# Patient Record
Sex: MALE | Race: WHITE | NOT HISPANIC OR LATINO | Employment: FULL TIME | URBAN - METROPOLITAN AREA
[De-identification: names, ages, dates, MRNs, and addresses within clinical notes are randomized per-mention and may not be internally consistent; named-entity substitution may affect disease eponyms.]

---

## 2023-10-13 ENCOUNTER — OFFICE VISIT (OUTPATIENT)
Dept: FAMILY MEDICINE | Facility: CLINIC | Age: 28
End: 2023-10-13
Payer: MEDICAID

## 2023-10-13 VITALS
OXYGEN SATURATION: 98 % | HEART RATE: 85 BPM | WEIGHT: 203.13 LBS | SYSTOLIC BLOOD PRESSURE: 160 MMHG | DIASTOLIC BLOOD PRESSURE: 103 MMHG | TEMPERATURE: 98 F

## 2023-10-13 DIAGNOSIS — Z76.89 ENCOUNTER TO ESTABLISH CARE: Primary | ICD-10-CM

## 2023-10-13 DIAGNOSIS — F32.A DEPRESSION, UNSPECIFIED DEPRESSION TYPE: ICD-10-CM

## 2023-10-13 DIAGNOSIS — R03.0 ELEVATED BLOOD PRESSURE READING: ICD-10-CM

## 2023-10-13 PROCEDURE — 3080F PR MOST RECENT DIASTOLIC BLOOD PRESSURE >= 90 MM HG: ICD-10-PCS | Mod: CPTII,,, | Performed by: NURSE PRACTITIONER

## 2023-10-13 PROCEDURE — 3080F DIAST BP >= 90 MM HG: CPT | Mod: CPTII,,, | Performed by: NURSE PRACTITIONER

## 2023-10-13 PROCEDURE — 3077F PR MOST RECENT SYSTOLIC BLOOD PRESSURE >= 140 MM HG: ICD-10-PCS | Mod: CPTII,,, | Performed by: NURSE PRACTITIONER

## 2023-10-13 PROCEDURE — 3077F SYST BP >= 140 MM HG: CPT | Mod: CPTII,,, | Performed by: NURSE PRACTITIONER

## 2023-10-13 PROCEDURE — 99203 PR OFFICE/OUTPT VISIT, NEW, LEVL III, 30-44 MIN: ICD-10-PCS | Mod: ,,, | Performed by: NURSE PRACTITIONER

## 2023-10-13 PROCEDURE — 99203 OFFICE O/P NEW LOW 30 MIN: CPT | Mod: ,,, | Performed by: NURSE PRACTITIONER

## 2023-10-13 RX ORDER — IBUPROFEN 800 MG/1
800 TABLET ORAL EVERY 6 HOURS PRN
COMMUNITY
Start: 2023-08-11

## 2023-10-13 RX ORDER — LISINOPRIL 10 MG/1
10 TABLET ORAL DAILY
Qty: 30 TABLET | Refills: 0 | Status: SHIPPED | OUTPATIENT
Start: 2023-10-13 | End: 2023-11-09 | Stop reason: SDUPTHER

## 2023-10-13 RX ORDER — ACETAMINOPHEN AND CODEINE PHOSPHATE 300; 30 MG/1; MG/1
1 TABLET ORAL
COMMUNITY
Start: 2023-08-11 | End: 2024-02-06 | Stop reason: ALTCHOICE

## 2023-10-13 NOTE — PROGRESS NOTES
SUBJECTIVE:     History of Present Illness      Chief Complaint: Establish Care (No pcp in 10 yrs)    HPI:  Patient is a 28 y.o. year old male who presents to clinic to establish care as a new pt. No previous PCP .   Patient presents to the clinic today with his father ARACELI .  Reports he went to the dental office for dental work done,  subsequently noted to have elevated blood pressure readings  for multiple dental appointments.    Patient describes some minor headaches, which may  be associated with elevated blood pressure readings..  Patient presents to the clinic with a blood pressure log with elevated readings.    Denies any dizziness, chest pain palpitations or shortness a breath.  Patient took a dose of 2 of his father's lisinopril to  see if that made any difference in blood pressure.  Family history paternal hypertension /cardiac stents    Patient denies SI, HI.  States sometimes he just feels like he has little interest in some activities.    Review of Systems:    Review of Systems    12 point review of systems conducted, negative except as stated in the history of present illness. See HPI for details.     Previous History      Review of patient's allergies indicates:  No Known Allergies    Past Medical History:   Diagnosis Date    Anxiety     Depression     Hypertension      Current Outpatient Medications   Medication Instructions    acetaminophen-codeine 300-30mg (TYLENOL #3) 300-30 mg Tab 1 tablet, Oral, Every 4-6 hours PRN    ibuprofen (ADVIL,MOTRIN) 800 mg, Oral, Every 6 hours PRN    lisinopriL 10 mg, Oral, Daily     History reviewed. No pertinent surgical history.  Family History   Problem Relation Age of Onset    Cancer Mother     Hypertension Father     Diabetes Father     Heart disease Father        Social History     Tobacco Use    Smoking status: Never    Smokeless tobacco: Never   Substance Use Topics    Alcohol use: Never    Drug use: Never        Health Maintenance      Health Maintenance    Topic Date Due    Hepatitis C Screening  Never done    Lipid Panel  Never done    TETANUS VACCINE  05/13/2015       OBJECTIVE:     Physical Exam      Vital Signs Reviewed   Visit Vitals  BP (!) 160/103   Pulse 85   Temp 97.9 °F (36.6 °C)   Wt 92.1 kg (203 lb 1.6 oz)   SpO2 98%       Physical Exam    Physical Exam:  General: Alert, well nourished, no acute distress, non-toxic appearing.   Eyes: Anicteric sclera, without conjunctival injection, normal lids, no purulent drainage, EOMs grossly intact.   Ears: No tragal tenderness. Tympanic membranes intact, pearly grey, without effusion or erythema and with a positive light reflex.   Mouth: Posterior pharynx without erythema. No exudate, ulcerations, or lesion. No tonsillar swelling.   Neck: Supple, full ROM, no rigidity, no cervical adenopathy.   Cardio: Normal rate and rhythm    Resp: Respirations even and unlabored, clear to auscultation bilaterally.   Abd: No ecchymosis or distension. Normal bowel sounds in all 4 quadrants. No tenderness to palpation. No rebound tenderness or guarding. No CVA tenderness.   Skin: No rashes or open lesions noted.   MSK: No swelling. No abrasions or signs of trauma. Ambulating without assistance.   Neuro: Alert,oriented No focal deficits noted. Facial expressions even.   Psych: Cooperative, Normal affect          Assessment            ICD-10-CM ICD-9-CM   1. Encounter to establish care  Z76.89 V65.8   2. Elevated blood pressure reading  R03.0 796.2   3. Depression, unspecified depression type  F32.A 311       Plan       1. Encounter to establish care  Lab work ordered patient will complete prior to next office visit  2. Elevated blood pressure reading  - lisinopriL 10 MG tablet; Take 1 tablet (10 mg total) by mouth once daily.  Dispense: 30 tablet; Refill: 0.    Start new /current medication as prescribed   Low salt/ DASH diet   Discussed lifestyle modifications.   encourage aerobic excise at least 30 mins a day   Monitor BP daily  goal less than 140/90.    Log given to patient yo monitor BP check in 2 weeks   Denies headaches, blurred vision, or dizziness    3. Depression  Denies SI, HI or hallucinations.  Discussed with patient cognitive behavior therapy.  ED precautions discussed.  We will re-evaluate next few weeks if needed we can initiate medication    Orders Placed This Encounter    CBC Auto Differential    Comprehensive Metabolic Panel    Lipid Panel    TSH    Hemoglobin A1C    Urinalysis    Vitamin D    T4, Free    lisinopriL 10 MG tablet      Medication List with Changes/Refills   New Medications    LISINOPRIL 10 MG TABLET    Take 1 tablet (10 mg total) by mouth once daily.   Current Medications    ACETAMINOPHEN-CODEINE 300-30MG (TYLENOL #3) 300-30 MG TAB    Take 1 tablet by mouth every 4 to 6 hours as needed.    IBUPROFEN (ADVIL,MOTRIN) 800 MG TABLET    Take 800 mg by mouth every 6 (six) hours as needed.       Follow up in about 2 weeks (around 10/27/2023) for Blood pressure check, Nurse Visit and wellness in 3 weeks labs prior.   Follow up in about 2 weeks (around 10/27/2023) for Blood pressure check, Nurse Visit and wellness in 3 weeks labs prior. In addition to their scheduled follow up, the patient has also been instructed to follow up on as needed basis.   Future Appointments   Date Time Provider Department Center   10/27/2023  9:45 AM NURSE, [facility]   11/9/2023  9:00 AM Sergio Brice, NILESH [facility]

## 2023-10-27 ENCOUNTER — CLINICAL SUPPORT (OUTPATIENT)
Dept: FAMILY MEDICINE | Facility: CLINIC | Age: 28
End: 2023-10-27
Payer: MEDICAID

## 2023-10-27 VITALS — SYSTOLIC BLOOD PRESSURE: 122 MMHG | DIASTOLIC BLOOD PRESSURE: 72 MMHG

## 2023-10-27 DIAGNOSIS — R03.0 ELEVATED BLOOD PRESSURE READING: Primary | ICD-10-CM

## 2023-10-27 NOTE — PROGRESS NOTES
Patient returns today for 2 week nurse visit for B/P check.  No complaints noted.  B/P 122/72.  Instructed to keep appointment 11/09/2023 for wellness with labs prior.  Voices understanding and agrees.

## 2023-11-07 ENCOUNTER — LAB VISIT (OUTPATIENT)
Dept: LAB | Facility: HOSPITAL | Age: 28
End: 2023-11-07
Attending: NURSE PRACTITIONER
Payer: MEDICAID

## 2023-11-07 DIAGNOSIS — Z76.89 ENCOUNTER TO ESTABLISH CARE: ICD-10-CM

## 2023-11-07 LAB
ALBUMIN SERPL-MCNC: 4.3 G/DL (ref 3.5–5)
ALBUMIN/GLOB SERPL: 1.4 RATIO (ref 1.1–2)
ALP SERPL-CCNC: 83 UNIT/L (ref 40–150)
ALT SERPL-CCNC: 21 UNIT/L (ref 0–55)
APPEARANCE UR: CLEAR
AST SERPL-CCNC: 20 UNIT/L (ref 5–34)
BACTERIA #/AREA URNS AUTO: ABNORMAL /HPF
BASOPHILS # BLD AUTO: 0.03 X10(3)/MCL
BASOPHILS NFR BLD AUTO: 0.4 %
BILIRUB SERPL-MCNC: 0.6 MG/DL
BILIRUB UR QL STRIP.AUTO: ABNORMAL
BUN SERPL-MCNC: 14.7 MG/DL (ref 8.9–20.6)
CALCIUM SERPL-MCNC: 9.3 MG/DL (ref 8.4–10.2)
CHLORIDE SERPL-SCNC: 107 MMOL/L (ref 98–107)
CHOLEST SERPL-MCNC: 218 MG/DL
CHOLEST/HDLC SERPL: 7 {RATIO} (ref 0–5)
CO2 SERPL-SCNC: 27 MMOL/L (ref 22–29)
COLOR UR AUTO: YELLOW
CREAT SERPL-MCNC: 0.88 MG/DL (ref 0.73–1.18)
DEPRECATED CALCIDIOL+CALCIFEROL SERPL-MC: 19.1 NG/ML (ref 30–80)
EOSINOPHIL # BLD AUTO: 0.12 X10(3)/MCL (ref 0–0.9)
EOSINOPHIL NFR BLD AUTO: 1.8 %
ERYTHROCYTE [DISTWIDTH] IN BLOOD BY AUTOMATED COUNT: 12.5 % (ref 11.5–17)
EST. AVERAGE GLUCOSE BLD GHB EST-MCNC: 102.5 MG/DL
GFR SERPLBLD CREATININE-BSD FMLA CKD-EPI: >60 MLS/MIN/1.73/M2
GLOBULIN SER-MCNC: 3 GM/DL (ref 2.4–3.5)
GLUCOSE SERPL-MCNC: 101 MG/DL (ref 74–100)
GLUCOSE UR QL STRIP.AUTO: NEGATIVE
HBA1C MFR BLD: 5.2 %
HCT VFR BLD AUTO: 44.7 % (ref 42–52)
HDLC SERPL-MCNC: 32 MG/DL (ref 35–60)
HGB BLD-MCNC: 14.9 G/DL (ref 14–18)
IMM GRANULOCYTES # BLD AUTO: 0.02 X10(3)/MCL (ref 0–0.04)
IMM GRANULOCYTES NFR BLD AUTO: 0.3 %
KETONES UR QL STRIP.AUTO: NEGATIVE
LDLC SERPL CALC-MCNC: 153 MG/DL (ref 50–140)
LEUKOCYTE ESTERASE UR QL STRIP.AUTO: NEGATIVE
LYMPHOCYTES # BLD AUTO: 3 X10(3)/MCL (ref 0.6–4.6)
LYMPHOCYTES NFR BLD AUTO: 44.6 %
MCH RBC QN AUTO: 28 PG (ref 27–31)
MCHC RBC AUTO-ENTMCNC: 33.3 G/DL (ref 33–36)
MCV RBC AUTO: 84 FL (ref 80–94)
MONOCYTES # BLD AUTO: 0.49 X10(3)/MCL (ref 0.1–1.3)
MONOCYTES NFR BLD AUTO: 7.3 %
MUCOUS THREADS URNS QL MICRO: ABNORMAL /LPF
NEUTROPHILS # BLD AUTO: 3.06 X10(3)/MCL (ref 2.1–9.2)
NEUTROPHILS NFR BLD AUTO: 45.6 %
NITRITE UR QL STRIP.AUTO: NEGATIVE
PH UR STRIP.AUTO: 5.5 [PH]
PLATELET # BLD AUTO: 278 X10(3)/MCL (ref 130–400)
PMV BLD AUTO: 10.6 FL (ref 7.4–10.4)
POTASSIUM SERPL-SCNC: 4.2 MMOL/L (ref 3.5–5.1)
PROT SERPL-MCNC: 7.3 GM/DL (ref 6.4–8.3)
PROT UR QL STRIP.AUTO: ABNORMAL
RBC # BLD AUTO: 5.32 X10(6)/MCL (ref 4.7–6.1)
RBC #/AREA URNS AUTO: ABNORMAL /HPF
RBC UR QL AUTO: ABNORMAL
SODIUM SERPL-SCNC: 141 MMOL/L (ref 136–145)
SP GR UR STRIP.AUTO: >=1.03 (ref 1–1.03)
SQUAMOUS #/AREA URNS AUTO: ABNORMAL /HPF
T4 FREE SERPL-MCNC: 0.87 NG/DL (ref 0.7–1.48)
TRIGL SERPL-MCNC: 166 MG/DL (ref 34–140)
TSH SERPL-ACNC: 0.91 UIU/ML (ref 0.35–4.94)
UROBILINOGEN UR STRIP-ACNC: 0.2
VLDLC SERPL CALC-MCNC: 33 MG/DL
WBC # SPEC AUTO: 6.72 X10(3)/MCL (ref 4.5–11.5)
WBC #/AREA URNS AUTO: ABNORMAL /HPF

## 2023-11-07 PROCEDURE — 80053 COMPREHEN METABOLIC PANEL: CPT

## 2023-11-07 PROCEDURE — 85025 COMPLETE CBC W/AUTO DIFF WBC: CPT

## 2023-11-07 PROCEDURE — 83036 HEMOGLOBIN GLYCOSYLATED A1C: CPT

## 2023-11-07 PROCEDURE — 84439 ASSAY OF FREE THYROXINE: CPT

## 2023-11-07 PROCEDURE — 84443 ASSAY THYROID STIM HORMONE: CPT

## 2023-11-07 PROCEDURE — 80061 LIPID PANEL: CPT

## 2023-11-07 PROCEDURE — 82306 VITAMIN D 25 HYDROXY: CPT

## 2023-11-07 PROCEDURE — 36415 COLL VENOUS BLD VENIPUNCTURE: CPT

## 2023-11-09 ENCOUNTER — OFFICE VISIT (OUTPATIENT)
Dept: FAMILY MEDICINE | Facility: CLINIC | Age: 28
End: 2023-11-09
Payer: MEDICAID

## 2023-11-09 VITALS
WEIGHT: 203.13 LBS | BODY MASS INDEX: 30.79 KG/M2 | DIASTOLIC BLOOD PRESSURE: 84 MMHG | SYSTOLIC BLOOD PRESSURE: 126 MMHG | HEART RATE: 74 BPM | RESPIRATION RATE: 17 BRPM | TEMPERATURE: 98 F | OXYGEN SATURATION: 98 % | HEIGHT: 68 IN

## 2023-11-09 DIAGNOSIS — R03.0 ELEVATED BLOOD PRESSURE READING: ICD-10-CM

## 2023-11-09 DIAGNOSIS — Z97.3 WEARS GLASSES: ICD-10-CM

## 2023-11-09 DIAGNOSIS — I10 MILD HTN: ICD-10-CM

## 2023-11-09 DIAGNOSIS — E78.00 ELEVATED CHOLESTEROL: ICD-10-CM

## 2023-11-09 DIAGNOSIS — Z00.00 WELLNESS EXAMINATION: Primary | ICD-10-CM

## 2023-11-09 PROCEDURE — 3074F SYST BP LT 130 MM HG: CPT | Mod: CPTII,,, | Performed by: NURSE PRACTITIONER

## 2023-11-09 PROCEDURE — 3074F PR MOST RECENT SYSTOLIC BLOOD PRESSURE < 130 MM HG: ICD-10-PCS | Mod: CPTII,,, | Performed by: NURSE PRACTITIONER

## 2023-11-09 PROCEDURE — 4010F PR ACE/ARB THEARPY RXD/TAKEN: ICD-10-PCS | Mod: CPTII,,, | Performed by: NURSE PRACTITIONER

## 2023-11-09 PROCEDURE — 1159F MED LIST DOCD IN RCRD: CPT | Mod: CPTII,,, | Performed by: NURSE PRACTITIONER

## 2023-11-09 PROCEDURE — 3044F HG A1C LEVEL LT 7.0%: CPT | Mod: CPTII,,, | Performed by: NURSE PRACTITIONER

## 2023-11-09 PROCEDURE — 3044F PR MOST RECENT HEMOGLOBIN A1C LEVEL <7.0%: ICD-10-PCS | Mod: CPTII,,, | Performed by: NURSE PRACTITIONER

## 2023-11-09 PROCEDURE — 99395 PREV VISIT EST AGE 18-39: CPT | Mod: ,,, | Performed by: NURSE PRACTITIONER

## 2023-11-09 PROCEDURE — 3079F DIAST BP 80-89 MM HG: CPT | Mod: CPTII,,, | Performed by: NURSE PRACTITIONER

## 2023-11-09 PROCEDURE — 1159F PR MEDICATION LIST DOCUMENTED IN MEDICAL RECORD: ICD-10-PCS | Mod: CPTII,,, | Performed by: NURSE PRACTITIONER

## 2023-11-09 PROCEDURE — 4010F ACE/ARB THERAPY RXD/TAKEN: CPT | Mod: CPTII,,, | Performed by: NURSE PRACTITIONER

## 2023-11-09 PROCEDURE — 3008F BODY MASS INDEX DOCD: CPT | Mod: CPTII,,, | Performed by: NURSE PRACTITIONER

## 2023-11-09 PROCEDURE — 3008F PR BODY MASS INDEX (BMI) DOCUMENTED: ICD-10-PCS | Mod: CPTII,,, | Performed by: NURSE PRACTITIONER

## 2023-11-09 PROCEDURE — 99395 PR PREVENTIVE VISIT,EST,18-39: ICD-10-PCS | Mod: ,,, | Performed by: NURSE PRACTITIONER

## 2023-11-09 PROCEDURE — 3079F PR MOST RECENT DIASTOLIC BLOOD PRESSURE 80-89 MM HG: ICD-10-PCS | Mod: CPTII,,, | Performed by: NURSE PRACTITIONER

## 2023-11-09 RX ORDER — LISINOPRIL 10 MG/1
10 TABLET ORAL DAILY
Qty: 30 TABLET | Refills: 4 | Status: SHIPPED | OUTPATIENT
Start: 2023-11-09

## 2023-11-09 NOTE — PROGRESS NOTES
SUBJECTIVE:     History of Present Illness      Chief Complaint: Wellness exam (Discuss lab results.  No complaints at this time)    HPI:  Patient is a 28 y.o. year old male who presents to clinic for annual wellness and lab review.  The patient's general health status is described as good.  Since initiating lisinopril for hypertension control blood pressure has been stable.  Patient denies no unwanted side effects.  Denies chest pain, shortness and breath dizziness or blurred vision  Patient wears glasses last eye exam within 2 years ago.  Cholesterol elevated on labs. patient attributes elevated choleterol to diet .   most of his lifestyle including fast food, prepackaged  foods     Review of Systems:    Review of Systems    12 point review of systems conducted, negative except as stated in the history of present illness. See HPI for details.     Previous History      Review of patient's allergies indicates:  No Known Allergies    Past Medical History:   Diagnosis Date    Anxiety     Depression     Hypertension      Current Outpatient Medications   Medication Instructions    acetaminophen-codeine 300-30mg (TYLENOL #3) 300-30 mg Tab 1 tablet, Oral, Every 4-6 hours PRN    ibuprofen (ADVIL,MOTRIN) 800 mg, Oral, Every 6 hours PRN    lisinopriL 10 mg, Oral, Daily     History reviewed. No pertinent surgical history.  Family History   Problem Relation Age of Onset    Cancer Mother     Hypertension Father     Diabetes Father     Heart disease Father        Social History     Tobacco Use    Smoking status: Never    Smokeless tobacco: Never   Substance Use Topics    Alcohol use: Never    Drug use: Never        Health Maintenance      Health Maintenance   Topic Date Due    Hepatitis C Screening  Never done    TETANUS VACCINE  05/13/2015    Lipid Panel  Completed       OBJECTIVE:     Physical Exam      Vital Signs Reviewed   Visit Vitals  /84 (BP Location: Left arm, Patient Position: Sitting)   Pulse 74   Temp 97.5 °F  "(36.4 °C) (Oral)   Resp 17   Ht 5' 8" (1.727 m)   Wt 92.1 kg (203 lb 1.6 oz)   SpO2 98%   BMI 30.88 kg/m²       Physical Exam    Physical Exam:  General: Alert, well nourished, no acute distress, non-toxic appearing.   Eyes: Anicteric sclera, without conjunctival injection, normal lids, no purulent drainage, EOMs grossly intact.   Ears: No tragal tenderness. Tympanic membranes intact, pearly grey, without effusion or erythema and with a positive light reflex.   Mouth: Posterior pharynx without erythema. No exudate, ulcerations, or lesion. No tonsillar swelling.   Neck: Supple, full ROM, no rigidity, no cervical adenopathy.   Cardio: Normal rate and rhythm    Resp: Respirations even and unlabored, clear to auscultation bilaterally.   Abd: No ecchymosis or distension. Normal bowel sounds in all 4 quadrants. No tenderness to palpation. No rebound tenderness or guarding. No CVA tenderness.   Skin: No rashes or open lesions noted.   MSK: No swelling. No abrasions or signs of trauma. Ambulating without assistance.   Neuro: Alert,oriented No focal deficits noted. Facial expressions even.   Psych: Cooperative, Normal affect      Procedures    Procedures     Labs     Results for orders placed or performed in visit on 11/07/23   Comprehensive Metabolic Panel   Result Value Ref Range    Sodium Level 141 136 - 145 mmol/L    Potassium Level 4.2 3.5 - 5.1 mmol/L    Chloride 107 98 - 107 mmol/L    Carbon Dioxide 27 22 - 29 mmol/L    Glucose Level 101 (H) 74 - 100 mg/dL    Blood Urea Nitrogen 14.7 8.9 - 20.6 mg/dL    Creatinine 0.88 0.73 - 1.18 mg/dL    Calcium Level Total 9.3 8.4 - 10.2 mg/dL    Protein Total 7.3 6.4 - 8.3 gm/dL    Albumin Level 4.3 3.5 - 5.0 g/dL    Globulin 3.0 2.4 - 3.5 gm/dL    Albumin/Globulin Ratio 1.4 1.1 - 2.0 ratio    Bilirubin Total 0.6 <=1.5 mg/dL    Alkaline Phosphatase 83 40 - 150 unit/L    Alanine Aminotransferase 21 0 - 55 unit/L    Aspartate Aminotransferase 20 5 - 34 unit/L    eGFR >60 " mls/min/1.73/m2   Lipid Panel   Result Value Ref Range    Cholesterol Total 218 (H) <=200 mg/dL    HDL Cholesterol 32 (L) 35 - 60 mg/dL    Triglyceride 166 (H) 34 - 140 mg/dL    Cholesterol/HDL Ratio 7 (H) 0 - 5    Very Low Density Lipoprotein 33     LDL Cholesterol 153.00 (H) 50.00 - 140.00 mg/dL   TSH   Result Value Ref Range    TSH 0.907 0.350 - 4.940 uIU/mL   Hemoglobin A1C   Result Value Ref Range    Hemoglobin A1c 5.2 <=7.0 %    Estimated Average Glucose 102.5 mg/dL   Vitamin D   Result Value Ref Range    Vit D 25 OH 19.1 (L) 30.0 - 80.0 ng/mL   T4, Free   Result Value Ref Range    Thyroxine Free 0.87 0.70 - 1.48 ng/dL   CBC with Differential   Result Value Ref Range    WBC 6.72 4.50 - 11.50 x10(3)/mcL    RBC 5.32 4.70 - 6.10 x10(6)/mcL    Hgb 14.9 14.0 - 18.0 g/dL    Hct 44.7 42.0 - 52.0 %    MCV 84.0 80.0 - 94.0 fL    MCH 28.0 27.0 - 31.0 pg    MCHC 33.3 33.0 - 36.0 g/dL    RDW 12.5 11.5 - 17.0 %    Platelet 278 130 - 400 x10(3)/mcL    MPV 10.6 (H) 7.4 - 10.4 fL    Neut % 45.6 %    Lymph % 44.6 %    Mono % 7.3 %    Eos % 1.8 %    Basophil % 0.4 %    Lymph # 3.00 0.6 - 4.6 x10(3)/mcL    Neut # 3.06 2.1 - 9.2 x10(3)/mcL    Mono # 0.49 0.1 - 1.3 x10(3)/mcL    Eos # 0.12 0 - 0.9 x10(3)/mcL    Baso # 0.03 <=0.2 x10(3)/mcL    IG# 0.02 0 - 0.04 x10(3)/mcL    IG% 0.3 %       Chemistry:  Lab Results   Component Value Date     11/07/2023    K 4.2 11/07/2023    CHLORIDE 107 11/07/2023    BUN 14.7 11/07/2023    CREATININE 0.88 11/07/2023    EGFRNORACEVR >60 11/07/2023    GLUCOSE 101 (H) 11/07/2023    CALCIUM 9.3 11/07/2023    ALKPHOS 83 11/07/2023    LABPROT 7.3 11/07/2023    ALBUMIN 4.3 11/07/2023    AST 20 11/07/2023    ALT 21 11/07/2023    [id number] 19.1 (L) 11/07/2023    TSH 0.907 11/07/2023    VTAIAN0ONDD 0.87 11/07/2023        Lab Results   Component Value Date    HGBA1C 5.2 11/07/2023        Hematology:  Lab Results   Component Value Date    WBC 6.72 11/07/2023    HGB 14.9 11/07/2023    HCT 44.7  11/07/2023     11/07/2023       Lipid Panel:  Lab Results   Component Value Date    CHOL 218 (H) 11/07/2023    HDL 32 (L) 11/07/2023    .00 (H) 11/07/2023    TRIG 166 (H) 11/07/2023    TOTALCHOLEST 7 (H) 11/07/2023        Urine:  Lab Results   Component Value Date    COLORUA Yellow 11/07/2023    APPEARANCEUA Clear 11/07/2023    SGUA >=1.030 11/07/2023    PHUA 5.5 11/07/2023    PROTEINUA Trace (A) 11/07/2023    GLUCOSEUA Negative 11/07/2023    KETONESUA Negative 11/07/2023    BLOODUA Small (A) 11/07/2023    NITRITESUA Negative 11/07/2023    LEUKOCYTESUR Negative 11/07/2023    RBCUA 0-5 11/07/2023    WBCUA None Seen 11/07/2023    BACTERIA None Seen 11/07/2023         Assessment            ICD-10-CM ICD-9-CM   1. Wellness examination  Z00.00 V70.0   2. Mild HTN  I10 401.9   3. Elevated cholesterol  E78.00 272.0   4. Wears glasses  Z97.3 V49.89   5. Elevated blood pressure reading  R03.0 796.2       Plan       1. Wellness examination  Discussed labs and preventative screenings   Overall health status reviewed.    Significant chronic conditions addressed, including ongoing treatment plans.   Good health habits reinforced.    Cardiovascular disease risk factors discussed.   Appropriate recommendations and preventative care medical   information provided with annual wellness exams encouraged.  Vaccination status       2. Mild HTN    Improved  Controlled with medication.   Continue current medication as prescribed   Low salt/ DASH diet   Discussed lifestyle modifications.   encourage aerobic excise at least 30 mins a day   Monitor BP daily goal less than 140/90.   Denies headaches, blurred vision, or dizziness    3. Elevated cholesterol  - Lipid Panel; Future    4. Wears glasses    5. Elevated blood pressure reading  Improved   Controlled with medication.   Continue current medication as prescribed   Low salt/ DASH diet   Discussed lifestyle modifications.   encourage aerobic excise at least 30 mins a day    Monitor BP daily goal less than 140/90.   Denies headaches, blurred vision, or dizziness    Orders Placed This Encounter    Lipid Panel      Medication List with Changes/Refills   Current Medications    ACETAMINOPHEN-CODEINE 300-30MG (TYLENOL #3) 300-30 MG TAB    Take 1 tablet by mouth every 4 to 6 hours as needed.    IBUPROFEN (ADVIL,MOTRIN) 800 MG TABLET    Take 800 mg by mouth every 6 (six) hours as needed.    LISINOPRIL 10 MG TABLET    Take 1 tablet (10 mg total) by mouth once daily.       Follow up in about 12 weeks (around 2/1/2024) for Cholesterol recheck/ Labs prior    .   Follow up in about 12 weeks (around 2/1/2024) for Cholesterol recheck/ Labs prior    . In addition to their scheduled follow up, the patient has also been instructed to follow up on as needed basis.   Future Appointments   Date Time Provider Department Center   2/1/2024 10:30 AM Sergio Brice FNP [facility]   11/11/2024  8:15 AM Sergio Brice FNP [facility]

## 2024-01-31 ENCOUNTER — LAB VISIT (OUTPATIENT)
Dept: LAB | Facility: HOSPITAL | Age: 29
End: 2024-01-31
Attending: NURSE PRACTITIONER
Payer: MEDICAID

## 2024-01-31 DIAGNOSIS — E78.00 ELEVATED CHOLESTEROL: ICD-10-CM

## 2024-01-31 LAB
CHOLEST SERPL-MCNC: 202 MG/DL
CHOLEST/HDLC SERPL: 7 {RATIO} (ref 0–5)
HDLC SERPL-MCNC: 30 MG/DL (ref 35–60)
LDLC SERPL CALC-MCNC: 141 MG/DL (ref 50–140)
TRIGL SERPL-MCNC: 156 MG/DL (ref 34–140)
VLDLC SERPL CALC-MCNC: 31 MG/DL

## 2024-01-31 PROCEDURE — 80061 LIPID PANEL: CPT

## 2024-01-31 PROCEDURE — 36415 COLL VENOUS BLD VENIPUNCTURE: CPT

## 2024-02-01 ENCOUNTER — OFFICE VISIT (OUTPATIENT)
Dept: FAMILY MEDICINE | Facility: CLINIC | Age: 29
End: 2024-02-01
Payer: MEDICAID

## 2024-02-01 VITALS
BODY MASS INDEX: 29.78 KG/M2 | WEIGHT: 196.5 LBS | RESPIRATION RATE: 17 BRPM | SYSTOLIC BLOOD PRESSURE: 109 MMHG | HEIGHT: 68 IN | DIASTOLIC BLOOD PRESSURE: 64 MMHG | TEMPERATURE: 98 F | HEART RATE: 91 BPM | OXYGEN SATURATION: 98 %

## 2024-02-01 DIAGNOSIS — I10 MILD HTN: Primary | ICD-10-CM

## 2024-02-01 DIAGNOSIS — E78.00 ELEVATED CHOLESTEROL: ICD-10-CM

## 2024-02-01 DIAGNOSIS — K40.90 HERNIA, INGUINAL, RIGHT: ICD-10-CM

## 2024-02-01 PROCEDURE — 3078F DIAST BP <80 MM HG: CPT | Mod: CPTII,,, | Performed by: NURSE PRACTITIONER

## 2024-02-01 PROCEDURE — 3074F SYST BP LT 130 MM HG: CPT | Mod: CPTII,,, | Performed by: NURSE PRACTITIONER

## 2024-02-01 PROCEDURE — 3008F BODY MASS INDEX DOCD: CPT | Mod: CPTII,,, | Performed by: NURSE PRACTITIONER

## 2024-02-01 PROCEDURE — 1159F MED LIST DOCD IN RCRD: CPT | Mod: CPTII,,, | Performed by: NURSE PRACTITIONER

## 2024-02-01 PROCEDURE — 99213 OFFICE O/P EST LOW 20 MIN: CPT | Mod: ,,, | Performed by: NURSE PRACTITIONER

## 2024-02-01 RX ORDER — AMOXICILLIN 500 MG/1
500 CAPSULE ORAL 4 TIMES DAILY
COMMUNITY
Start: 2024-01-26 | End: 2024-05-01

## 2024-02-01 NOTE — PROGRESS NOTES
SUBJECTIVE:     History of Present Illness      Chief Complaint: Follow-up (3 month follow up visit to discuss lab results (lipid panel).  C/O possible right groin hernia x one week, associated with some testicular aching)    HPI:  Patient is a 28 y.o. year old male who presents to clinic for three-month follow-up elevated cholesterol.  Patient has been trying diet and exercise.    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    Review of Systems:    Review of Systems    12 point review of systems conducted, negative except as stated in the history of present illness. See HPI for details.     Previous History    Sergio Brice, NILESH  Review of patient's allergies indicates:  No Known Allergies    Past Medical History:   Diagnosis Date    Anxiety     Depression     Hypertension      Current Outpatient Medications   Medication Instructions    acetaminophen-codeine 300-30mg (TYLENOL #3) 300-30 mg Tab 1 tablet, Oral, Every 4-6 hours PRN    amoxicillin (AMOXIL) 500 mg, Oral, 4 times daily    ibuprofen (ADVIL,MOTRIN) 800 mg, Oral, Every 6 hours PRN    lisinopriL 10 mg, Oral, Daily     History reviewed. No pertinent surgical history.  Family History   Problem Relation Age of Onset    Cancer Mother     Hypertension Father     Diabetes Father     Heart disease Father        Social History     Tobacco Use    Smoking status: Never    Smokeless tobacco: Never   Substance Use Topics    Alcohol use: Never    Drug use: Never        Health Maintenance      Health Maintenance   Topic Date Due    Hepatitis C Screening  Never done    High Dose Statin  Never done    TETANUS VACCINE  05/13/2015    Lipid Panel  Completed       OBJECTIVE:     Physical Exam      Vital Signs Reviewed   Visit Vitals  /64 (BP Location: Left arm,  "Patient Position: Sitting)   Pulse 91   Temp 97.6 °F (36.4 °C) (Oral)   Resp 17   Ht 5' 8" (1.727 m)   Wt 89.1 kg (196 lb 8 oz)   SpO2 98%   BMI 29.88 kg/m²       Physical Exam    Physical Exam:  General: Alert, well nourished, no acute distress, non-toxic appearing.   Eyes: Anicteric sclera, without conjunctival injection, normal lids, no purulent drainage, EOMs grossly intact.   Ears: No tragal tenderness. Tympanic membranes intact, pearly grey, without effusion or erythema and with a positive light reflex.   Mouth: Posterior pharynx without erythema. No exudate, ulcerations, or lesion. No tonsillar swelling.   Neck: Supple, full ROM, no rigidity, no cervical adenopathy.   Cardio: Normal rate and rhythm    Resp: Respirations even and unlabored, clear to auscultation bilaterally.   Abd: No ecchymosis or distension. Normal bowel sounds in all 4 quadrants. No tenderness to palpation. No rebound tenderness or guarding. No CVA tenderness.   Skin: No rashes or open lesions noted.   MSK: No swelling. No abrasions or signs of trauma. Ambulating without assistance.   Neuro: Alert,oriented No focal deficits noted. Facial expressions even.   Psych: Cooperative, Normal affect      Procedures    Procedures     Labs     Results for orders placed or performed in visit on 01/31/24   Lipid Panel   Result Value Ref Range    Cholesterol Total 202 (H) <=200 mg/dL    HDL Cholesterol 30 (L) 35 - 60 mg/dL    Triglyceride 156 (H) 34 - 140 mg/dL    Cholesterol/HDL Ratio 7 (H) 0 - 5    Very Low Density Lipoprotein 31     LDL Cholesterol 141.00 (H) 50.00 - 140.00 mg/dL       Chemistry:  Lab Results   Component Value Date     11/07/2023    K 4.2 11/07/2023    CHLORIDE 107 11/07/2023    BUN 14.7 11/07/2023    CREATININE 0.88 11/07/2023    EGFRNORACEVR >60 11/07/2023    GLUCOSE 101 (H) 11/07/2023    CALCIUM 9.3 11/07/2023    ALKPHOS 83 11/07/2023    LABPROT 7.3 11/07/2023    ALBUMIN 4.3 11/07/2023    AST 20 11/07/2023    ALT 21 " 11/07/2023    CCNNXUAC27IC 19.1 (L) 11/07/2023    TSH 0.907 11/07/2023    YWHOCI8ULRM 0.87 11/07/2023        Lab Results   Component Value Date    HGBA1C 5.2 11/07/2023        Hematology:  Lab Results   Component Value Date    WBC 6.72 11/07/2023    HGB 14.9 11/07/2023    HCT 44.7 11/07/2023     11/07/2023       Lipid Panel:  Lab Results   Component Value Date    CHOL 202 (H) 01/31/2024    HDL 30 (L) 01/31/2024    .00 (H) 01/31/2024    TRIG 156 (H) 01/31/2024    TOTALCHOLEST 7 (H) 01/31/2024        Urine:  Lab Results   Component Value Date    COLORUA Yellow 11/07/2023    APPEARANCEUA Clear 11/07/2023    SGUA >=1.030 11/07/2023    PHUA 5.5 11/07/2023    PROTEINUA Trace (A) 11/07/2023    GLUCOSEUA Negative 11/07/2023    KETONESUA Negative 11/07/2023    BLOODUA Small (A) 11/07/2023    NITRITESUA Negative 11/07/2023    LEUKOCYTESUR Negative 11/07/2023    RBCUA 0-5 11/07/2023    WBCUA None Seen 11/07/2023    BACTERIA None Seen 11/07/2023         Assessment            ICD-10-CM ICD-9-CM   1. Mild HTN  I10 401.9   2. Elevated cholesterol  E78.00 272.0   3. Hernia, inguinal, right  K40.90 550.90       Plan       1. Mild HTN  Stable improved   Controlled with medication.   Continue current medication as prescribed   Low salt/ DASH diet   Discussed lifestyle modifications.   encourage aerobic excise at least 30 mins a day   Monitor BP daily goal less than 140/90.   Denies headaches, blurred vision, or dizziness    2. Elevated cholesterol  - Lipid Panel; Future    3. Hernia, inguinal, right  - US Pelvis Complete Non OB; Future    Orders Placed This Encounter    US Pelvis Complete Non OB    Lipid Panel      Medication List with Changes/Refills   Current Medications    ACETAMINOPHEN-CODEINE 300-30MG (TYLENOL #3) 300-30 MG TAB    Take 1 tablet by mouth every 4 to 6 hours as needed.    AMOXICILLIN (AMOXIL) 500 MG CAPSULE    Take 500 mg by mouth 4 (four) times daily.    IBUPROFEN (ADVIL,MOTRIN) 800 MG TABLET    Take  800 mg by mouth every 6 (six) hours as needed.    LISINOPRIL 10 MG TABLET    Take 1 tablet (10 mg total) by mouth once daily.       Follow up in about 3 months (around 5/1/2024) for LABS Prior.   Follow up in about 3 months (around 5/1/2024) for LABS Prior. In addition to their scheduled follow up, the patient has also been instructed to follow up on as needed basis.   Future Appointments   Date Time Provider Department [place]   2/9/2024  1:00 PM [address] [place]    5/1/2024  1:30 PM Sergio Brice FNP [facility]   11/11/2024  8:15 [facility]

## 2024-02-09 ENCOUNTER — HOSPITAL ENCOUNTER (OUTPATIENT)
Dept: RADIOLOGY | Facility: HOSPITAL | Age: 29
Discharge: HOME | End: 2024-02-09
Attending: NURSE PRACTITIONER
Payer: MEDICAID

## 2024-02-09 DIAGNOSIS — K40.90 HERNIA, INGUINAL, RIGHT: ICD-10-CM

## 2024-02-09 PROCEDURE — 76856 US EXAM PELVIC COMPLETE: CPT | Mod: TC

## 2024-02-27 ENCOUNTER — TELEPHONE (OUTPATIENT)
Dept: FAMILY MEDICINE | Facility: CLINIC | Age: 29
End: 2024-02-27
Payer: MEDICAID

## 2024-02-27 NOTE — PROGRESS NOTES
Please inform patient of results.    1. Please inform patient ultrasound  impression shows right inguinal hernia-  we will make a referral to gen surgeon  Please inform patient if he has not heard from surgeon in 2 weeks contact our office avoid delay in care  All other results within acceptable ranges.

## 2024-02-27 NOTE — TELEPHONE ENCOUNTER
Called patient to report ultrasound shows right inguinal hernia.  Patient states he is a workmans comp case and they already received his results.  He is seeing Dr Kong Watson (general surgeon) on Thursday March 7th for evaluation.  Instructed to call prn prior to next scheduled appointment.

## 2024-02-27 NOTE — TELEPHONE ENCOUNTER
----- Message from NILESH Cook sent at 2/27/2024  4:25 PM CST -----  Please inform patient of results.    1. Please inform patient ultrasound  impression shows right inguinal hernia-  we will make a referral to gen surgeon  Please inform patient if he has not heard from surgeon in 2 weeks contact our office avoid delay in care  All other results within acceptable ranges.

## 2024-04-29 ENCOUNTER — LAB VISIT (OUTPATIENT)
Dept: LAB | Facility: HOSPITAL | Age: 29
End: 2024-04-29
Attending: NURSE PRACTITIONER
Payer: MEDICAID

## 2024-04-29 DIAGNOSIS — E78.00 ELEVATED CHOLESTEROL: ICD-10-CM

## 2024-04-29 LAB
CHOLEST SERPL-MCNC: 232 MG/DL
CHOLEST/HDLC SERPL: 7 {RATIO} (ref 0–5)
HDLC SERPL-MCNC: 35 MG/DL (ref 35–60)
LDLC SERPL CALC-MCNC: 164 MG/DL (ref 50–140)
TRIGL SERPL-MCNC: 163 MG/DL (ref 34–140)
VLDLC SERPL CALC-MCNC: 33 MG/DL

## 2024-04-29 PROCEDURE — 80061 LIPID PANEL: CPT

## 2024-04-29 PROCEDURE — 36415 COLL VENOUS BLD VENIPUNCTURE: CPT

## 2024-05-01 ENCOUNTER — OFFICE VISIT (OUTPATIENT)
Dept: FAMILY MEDICINE | Facility: CLINIC | Age: 29
End: 2024-05-01
Payer: MEDICAID

## 2024-05-01 VITALS
WEIGHT: 206.38 LBS | OXYGEN SATURATION: 98 % | SYSTOLIC BLOOD PRESSURE: 130 MMHG | DIASTOLIC BLOOD PRESSURE: 89 MMHG | HEIGHT: 68 IN | BODY MASS INDEX: 31.28 KG/M2 | TEMPERATURE: 98 F | HEART RATE: 85 BPM

## 2024-05-01 DIAGNOSIS — R03.0 ELEVATED BLOOD PRESSURE READING: ICD-10-CM

## 2024-05-01 DIAGNOSIS — I10 MILD HTN: Primary | ICD-10-CM

## 2024-05-01 DIAGNOSIS — I10 HYPERTENSION, UNSPECIFIED TYPE: ICD-10-CM

## 2024-05-01 DIAGNOSIS — E78.00 ELEVATED CHOLESTEROL: ICD-10-CM

## 2024-05-01 PROCEDURE — 3008F BODY MASS INDEX DOCD: CPT | Mod: CPTII,,, | Performed by: NURSE PRACTITIONER

## 2024-05-01 PROCEDURE — 1159F MED LIST DOCD IN RCRD: CPT | Mod: CPTII,,, | Performed by: NURSE PRACTITIONER

## 2024-05-01 PROCEDURE — 99213 OFFICE O/P EST LOW 20 MIN: CPT | Mod: ,,, | Performed by: NURSE PRACTITIONER

## 2024-05-01 PROCEDURE — 4010F ACE/ARB THERAPY RXD/TAKEN: CPT | Mod: CPTII,,, | Performed by: NURSE PRACTITIONER

## 2024-05-01 PROCEDURE — 3075F SYST BP GE 130 - 139MM HG: CPT | Mod: CPTII,,, | Performed by: NURSE PRACTITIONER

## 2024-05-01 PROCEDURE — 3079F DIAST BP 80-89 MM HG: CPT | Mod: CPTII,,, | Performed by: NURSE PRACTITIONER

## 2024-05-01 RX ORDER — SIMVASTATIN 20 MG/1
20 TABLET, FILM COATED ORAL NIGHTLY
Qty: 90 TABLET | Refills: 1 | Status: SHIPPED | OUTPATIENT
Start: 2024-05-01 | End: 2025-05-01

## 2024-05-01 RX ORDER — LISINOPRIL 10 MG/1
10 TABLET ORAL DAILY
Qty: 30 TABLET | Refills: 4 | Status: SHIPPED | OUTPATIENT
Start: 2024-05-01

## 2024-05-01 NOTE — PATIENT INSTRUCTIONS
Controlled with medication.   Continue current medication as prescribed   Low salt/ DASH diet   Discussed lifestyle modifications.   encourage aerobic excise at least 30 mins a day   Monitor BP daily goal less than 140/90.   Denies headaches, blurred vision, or dizziness

## 2024-05-01 NOTE — PROGRESS NOTES
"SUBJECTIVE:     History of Present Illness      Chief Complaint: Follow-up (3 month with labs )    HPI:  Patient is a 28 y.o. year old male who presents to clinic for three-month hypertension cholesterol follow-up.  Patient states since  his hernia surgery  x2 months ago . He has not had time to to try therapeutic lifestyle changes for cholesterol.    Blood pressure is controlled.  Denies headaches, dizziness blurred vision  Review of Systems:    Review of Systems    12 point review of systems conducted, negative except as stated in the history of present illness. See HPI for details.     Previous History    Sergio Brice, NILESH  Review of patient's allergies indicates:  No Known Allergies    Past Medical History:   Diagnosis Date    Anxiety     Depression     Hypertension      Current Outpatient Medications   Medication Instructions    lisinopriL 10 mg, Oral, Daily    simvastatin (ZOCOR) 20 mg, Oral, Nightly     No past surgical history on file.  Family History   Problem Relation Name Age of Onset    Cancer Mother      Hypertension Father      Diabetes Father      Heart disease Father         Social History     Tobacco Use    Smoking status: Never    Smokeless tobacco: Never   Substance Use Topics    Alcohol use: Never    Drug use: Never        Health Maintenance      Health Maintenance   Topic Date Due    Hepatitis C Screening  Never done    TETANUS VACCINE  05/13/2015    High Dose Statin  05/01/2025    Lipid Panel  Completed       OBJECTIVE:     Physical Exam      Vital Signs Reviewed   Visit Vitals  /89   Pulse 85   Temp 97.6 °F (36.4 °C)   Ht 5' 8" (1.727 m)   Wt 93.6 kg (206 lb 6.4 oz)   SpO2 98%   BMI 31.38 kg/m²       Physical Exam    Physical Exam:  General: Alert, well nourished, no acute distress, non-toxic appearing.   Eyes: Anicteric sclera, without conjunctival injection, normal lids, no purulent drainage, EOMs grossly intact.   Ears: No tragal tenderness. Tympanic membranes intact, pearly grey, " without effusion or erythema and with a positive light reflex.   Mouth: Posterior pharynx without erythema. No exudate, ulcerations, or lesion. No tonsillar swelling.   Neck: Supple, full ROM, no rigidity, no cervical adenopathy.   Cardio: Normal rate and rhythm    Resp: Respirations even and unlabored, clear to auscultation bilaterally.   Abd: No ecchymosis or distension. Normal bowel sounds in all 4 quadrants. No tenderness to palpation. No rebound tenderness or guarding. No CVA tenderness.   Skin: No rashes or open lesions noted.   MSK: No swelling. No abrasions or signs of trauma. Ambulating without assistance.   Neuro: Alert,oriented No focal deficits noted. Facial expressions even.   Psych: Cooperative, Normal affect      Procedures    Procedures     Labs     Results for orders placed or performed in visit on 04/29/24   Lipid Panel   Result Value Ref Range    Cholesterol Total 232 (H) <=200 mg/dL    HDL Cholesterol 35 35 - 60 mg/dL    Triglyceride 163 (H) 34 - 140 mg/dL    Cholesterol/HDL Ratio 7 (H) 0 - 5    Very Low Density Lipoprotein 33     LDL Cholesterol 164.00 (H) 50.00 - 140.00 mg/dL       Chemistry:  Lab Results   Component Value Date     11/07/2023    K 4.2 11/07/2023    CHLORIDE 107 11/07/2023    BUN 14.7 11/07/2023    CREATININE 0.88 11/07/2023    EGFRNORACEVR >60 11/07/2023    GLUCOSE 101 (H) 11/07/2023    CALCIUM 9.3 11/07/2023    ALKPHOS 83 11/07/2023    LABPROT 7.3 11/07/2023    ALBUMIN 4.3 11/07/2023    AST 20 11/07/2023    ALT 21 11/07/2023    FQSIAEQG39LS 19.1 (L) 11/07/2023    TSH 0.907 11/07/2023    VQSDZV6KAUU 0.87 11/07/2023        Lab Results   Component Value Date    HGBA1C 5.2 11/07/2023        Hematology:  Lab Results   Component Value Date    WBC 6.72 11/07/2023    HGB 14.9 11/07/2023    HCT 44.7 11/07/2023     11/07/2023       Lipid Panel:  Lab Results   Component Value Date    CHOL 232 (H) 04/29/2024    HDL 35 04/29/2024    .00 (H) 04/29/2024    TRIG 163 (H)  04/29/2024    TOTALCHOLEST 7 (H) 04/29/2024        Urine:  Lab Results   Component Value Date    COLORUA Yellow 11/07/2023    APPEARANCEUA Clear 11/07/2023    SGUA >=1.030 11/07/2023    PHUA 5.5 11/07/2023    PROTEINUA Trace (A) 11/07/2023    GLUCOSEUA Negative 11/07/2023    KETONESUA Negative 11/07/2023    BLOODUA Small (A) 11/07/2023    NITRITESUA Negative 11/07/2023    LEUKOCYTESUR Negative 11/07/2023    RBCUA 0-5 11/07/2023    WBCUA None Seen 11/07/2023    BACTERIA None Seen 11/07/2023         Assessment            ICD-10-CM ICD-9-CM   1. Mild HTN  I10 401.9   2. Elevated cholesterol  E78.00 272.0   3. Hypertension, unspecified type  I10 401.9   4. Elevated blood pressure reading  R03.0 796.2       Plan       1. Mild HTN  Stable /Controlled with medication.   Continue current medication as prescribed   Low salt/ DASH diet   Discussed lifestyle modifications.   encourage aerobic excise at least 30 mins a day   Monitor BP daily goal less than 140/90.   Denies headaches, blurred vision, or dizziness    2. Elevated cholesterol  Start statin daily as prescribed.   Low fat, low cholesterol diet .  Increase dietary fiber  Avoid fried, fatty , high saturated fats.  Add flaxseed or fish oil omega supplement to diet .   exercise to reduce LDL and raise HDL. Exercise at least 30 mins a day as tolerated     Orders Placed This Encounter    CBC Auto Differential    Comprehensive Metabolic Panel    Lipid Panel    TSH    Hemoglobin A1C    Vitamin D    simvastatin (ZOCOR) 20 MG tablet    lisinopriL 10 MG tablet      Medication List with Changes/Refills   New Medications    SIMVASTATIN (ZOCOR) 20 MG TABLET    Take 1 tablet (20 mg total) by mouth every evening.   Changed and/or Refilled Medications    Modified Medication Previous Medication    LISINOPRIL 10 MG TABLET lisinopriL 10 MG tablet       Take 1 tablet (10 mg total) by mouth once daily.    Take 1 tablet (10 mg total) by mouth once daily.   Discontinued Medications     AMOXICILLIN (AMOXIL) 500 MG CAPSULE    Take 500 mg by mouth 4 (four) times daily.    IBUPROFEN (ADVIL,MOTRIN) 800 MG TABLET    Take 800 mg by mouth every 6 (six) hours as needed.       Follow up if symptoms worsen or fail to improve, for Wellness already scheduled, LABS Prior.   Follow up if symptoms worsen or fail to improve, for Wellness already scheduled, LABS Prior. In addition to their scheduled follow up, the patient has also been instructed to follow up on as needed basis.   Future Appointments   Date Time Provider Department Center   11/11/2024  8:15 AM Sergio Brice, FNP [facility]

## 2024-11-05 ENCOUNTER — TELEPHONE (OUTPATIENT)
Dept: FAMILY MEDICINE | Facility: CLINIC | Age: 29
End: 2024-11-05
Payer: MEDICAID

## 2024-11-08 ENCOUNTER — LAB VISIT (OUTPATIENT)
Dept: LAB | Facility: HOSPITAL | Age: 29
End: 2024-11-08
Attending: NURSE PRACTITIONER
Payer: MEDICAID

## 2024-11-08 DIAGNOSIS — I10 MILD HTN: ICD-10-CM

## 2024-11-08 LAB
25(OH)D3+25(OH)D2 SERPL-MCNC: 15 NG/ML (ref 30–80)
ALBUMIN SERPL-MCNC: 4.4 G/DL (ref 3.5–5)
ALBUMIN/GLOB SERPL: 1.4 RATIO (ref 1.1–2)
ALP SERPL-CCNC: 75 UNIT/L (ref 40–150)
ALT SERPL-CCNC: 28 UNIT/L (ref 0–55)
ANION GAP SERPL CALC-SCNC: 7 MEQ/L
AST SERPL-CCNC: 19 UNIT/L (ref 5–34)
BASOPHILS # BLD AUTO: 0.04 X10(3)/MCL
BASOPHILS NFR BLD AUTO: 0.4 %
BILIRUB SERPL-MCNC: 0.5 MG/DL
BUN SERPL-MCNC: 15.5 MG/DL (ref 8.9–20.6)
CALCIUM SERPL-MCNC: 9.8 MG/DL (ref 8.4–10.2)
CHLORIDE SERPL-SCNC: 106 MMOL/L (ref 98–107)
CHOLEST SERPL-MCNC: 201 MG/DL
CHOLEST/HDLC SERPL: 7 {RATIO} (ref 0–5)
CO2 SERPL-SCNC: 28 MMOL/L (ref 22–29)
CREAT SERPL-MCNC: 1.07 MG/DL (ref 0.72–1.25)
CREAT/UREA NIT SERPL: 14
EOSINOPHIL # BLD AUTO: 0.09 X10(3)/MCL (ref 0–0.9)
EOSINOPHIL NFR BLD AUTO: 1 %
ERYTHROCYTE [DISTWIDTH] IN BLOOD BY AUTOMATED COUNT: 12.5 % (ref 11.5–17)
EST. AVERAGE GLUCOSE BLD GHB EST-MCNC: 105.4 MG/DL
GFR SERPLBLD CREATININE-BSD FMLA CKD-EPI: >60 ML/MIN/1.73/M2
GLOBULIN SER-MCNC: 3.2 GM/DL (ref 2.4–3.5)
GLUCOSE SERPL-MCNC: 101 MG/DL (ref 74–100)
HBA1C MFR BLD: 5.3 %
HCT VFR BLD AUTO: 45.8 % (ref 42–52)
HDLC SERPL-MCNC: 29 MG/DL (ref 35–60)
HGB BLD-MCNC: 15.9 G/DL (ref 14–18)
IMM GRANULOCYTES # BLD AUTO: 0.03 X10(3)/MCL (ref 0–0.04)
IMM GRANULOCYTES NFR BLD AUTO: 0.3 %
LDLC SERPL CALC-MCNC: 126 MG/DL (ref 50–140)
LYMPHOCYTES # BLD AUTO: 3.44 X10(3)/MCL (ref 0.6–4.6)
LYMPHOCYTES NFR BLD AUTO: 36.8 %
MCH RBC QN AUTO: 29.6 PG (ref 27–31)
MCHC RBC AUTO-ENTMCNC: 34.7 G/DL (ref 33–36)
MCV RBC AUTO: 85.3 FL (ref 80–94)
MONOCYTES # BLD AUTO: 0.74 X10(3)/MCL (ref 0.1–1.3)
MONOCYTES NFR BLD AUTO: 7.9 %
NEUTROPHILS # BLD AUTO: 5.02 X10(3)/MCL (ref 2.1–9.2)
NEUTROPHILS NFR BLD AUTO: 53.6 %
PLATELET # BLD AUTO: 284 X10(3)/MCL (ref 130–400)
PMV BLD AUTO: 10.4 FL (ref 7.4–10.4)
POTASSIUM SERPL-SCNC: 4.9 MMOL/L (ref 3.5–5.1)
PROT SERPL-MCNC: 7.6 GM/DL (ref 6.4–8.3)
RBC # BLD AUTO: 5.37 X10(6)/MCL (ref 4.7–6.1)
SODIUM SERPL-SCNC: 141 MMOL/L (ref 136–145)
TRIGL SERPL-MCNC: 229 MG/DL (ref 34–140)
TSH SERPL-ACNC: 2.43 UIU/ML (ref 0.35–4.94)
VLDLC SERPL CALC-MCNC: 46 MG/DL
WBC # BLD AUTO: 9.36 X10(3)/MCL (ref 4.5–11.5)

## 2024-11-08 PROCEDURE — 80053 COMPREHEN METABOLIC PANEL: CPT

## 2024-11-08 PROCEDURE — 82306 VITAMIN D 25 HYDROXY: CPT

## 2024-11-08 PROCEDURE — 83036 HEMOGLOBIN GLYCOSYLATED A1C: CPT

## 2024-11-08 PROCEDURE — 85025 COMPLETE CBC W/AUTO DIFF WBC: CPT

## 2024-11-08 PROCEDURE — 36415 COLL VENOUS BLD VENIPUNCTURE: CPT

## 2024-11-08 PROCEDURE — 84443 ASSAY THYROID STIM HORMONE: CPT

## 2024-11-08 PROCEDURE — 80061 LIPID PANEL: CPT

## 2024-11-11 ENCOUNTER — OFFICE VISIT (OUTPATIENT)
Dept: FAMILY MEDICINE | Facility: CLINIC | Age: 29
End: 2024-11-11
Payer: MEDICAID

## 2024-11-11 VITALS
BODY MASS INDEX: 28.42 KG/M2 | DIASTOLIC BLOOD PRESSURE: 78 MMHG | HEART RATE: 102 BPM | WEIGHT: 191.88 LBS | OXYGEN SATURATION: 97 % | TEMPERATURE: 98 F | HEIGHT: 69 IN | SYSTOLIC BLOOD PRESSURE: 117 MMHG

## 2024-11-11 DIAGNOSIS — E55.9 VITAMIN D DEFICIENCY: ICD-10-CM

## 2024-11-11 DIAGNOSIS — E78.00 ELEVATED CHOLESTEROL: ICD-10-CM

## 2024-11-11 DIAGNOSIS — R03.0 ELEVATED BLOOD PRESSURE READING: ICD-10-CM

## 2024-11-11 DIAGNOSIS — Z00.00 WELLNESS EXAMINATION: Primary | ICD-10-CM

## 2024-11-11 DIAGNOSIS — I10 MILD HTN: ICD-10-CM

## 2024-11-11 PROCEDURE — 3074F SYST BP LT 130 MM HG: CPT | Mod: CPTII,,, | Performed by: NURSE PRACTITIONER

## 2024-11-11 PROCEDURE — 1159F MED LIST DOCD IN RCRD: CPT | Mod: CPTII,,, | Performed by: NURSE PRACTITIONER

## 2024-11-11 PROCEDURE — 1160F RVW MEDS BY RX/DR IN RCRD: CPT | Mod: CPTII,,, | Performed by: NURSE PRACTITIONER

## 2024-11-11 PROCEDURE — 3078F DIAST BP <80 MM HG: CPT | Mod: CPTII,,, | Performed by: NURSE PRACTITIONER

## 2024-11-11 PROCEDURE — 3044F HG A1C LEVEL LT 7.0%: CPT | Mod: CPTII,,, | Performed by: NURSE PRACTITIONER

## 2024-11-11 PROCEDURE — 3008F BODY MASS INDEX DOCD: CPT | Mod: CPTII,,, | Performed by: NURSE PRACTITIONER

## 2024-11-11 PROCEDURE — 99395 PREV VISIT EST AGE 18-39: CPT | Mod: ,,, | Performed by: NURSE PRACTITIONER

## 2024-11-11 PROCEDURE — 4010F ACE/ARB THERAPY RXD/TAKEN: CPT | Mod: CPTII,,, | Performed by: NURSE PRACTITIONER

## 2024-11-11 RX ORDER — LISINOPRIL 10 MG/1
10 TABLET ORAL DAILY
Qty: 30 TABLET | Refills: 4 | Status: SHIPPED | OUTPATIENT
Start: 2024-11-11

## 2024-11-11 RX ORDER — ERGOCALCIFEROL 1.25 MG/1
50000 CAPSULE ORAL
Qty: 12 CAPSULE | Refills: 0 | Status: SHIPPED | OUTPATIENT
Start: 2024-11-11

## 2024-11-11 RX ORDER — SIMVASTATIN 20 MG/1
20 TABLET, FILM COATED ORAL NIGHTLY
Qty: 90 TABLET | Refills: 1 | Status: SHIPPED | OUTPATIENT
Start: 2024-11-11 | End: 2025-11-11

## 2024-11-11 NOTE — PROGRESS NOTES
"SUBJECTIVE:     History of Present Illness      Chief Complaint: wellness with lab review (Didn't change his duet yet)    HPI:  Patient is a 29 y.o. year old male who presents to clinic for wellness and lab review.  Medical problems hypertension, hyperlipidemia.  Patient reports diet consumes fast food processed  food at work , he eats on  the go    Review of Systems:    Review of Systems    12 point review of systems conducted, negative except as stated in the history of present illness. See HPI for details.     Previous History    Sergio Brice, JENNYP  Review of patient's allergies indicates:  No Known Allergies    Past Medical History:   Diagnosis Date    Anxiety     Depression     Hypertension      Current Outpatient Medications   Medication Instructions    ergocalciferol (ERGOCALCIFEROL) 50,000 Units, Oral, Every 7 days    lisinopriL 10 mg, Oral, Daily    simvastatin (ZOCOR) 20 mg, Oral, Nightly     History reviewed. No pertinent surgical history.  Family History   Problem Relation Name Age of Onset    Cancer Mother      Hypertension Father      Diabetes Father      Heart disease Father         Social History     Tobacco Use    Smoking status: Never    Smokeless tobacco: Never   Substance Use Topics    Alcohol use: Never    Drug use: Never        Health Maintenance      Health Maintenance   Topic Date Due    Hepatitis C Screening  Never done    TETANUS VACCINE  05/13/2015    Lipid Panel  Completed       OBJECTIVE:     Physical Exam      Vital Signs Reviewed   Visit Vitals  /78   Pulse 102   Temp 98.1 °F (36.7 °C)   Ht 5' 8.5" (1.74 m)   Wt 87 kg (191 lb 14.4 oz)   SpO2 97%   BMI 28.75 kg/m²       Physical Exam    Physical Exam:  General: Alert, well nourished, no acute distress, non-toxic appearing.   Eyes: Anicteric sclera, without conjunctival injection, normal lids, no purulent drainage, EOMs grossly intact.   Ears: No tragal tenderness. Tympanic membranes intact, pearly grey, without effusion or " erythema and with a positive light reflex.   Mouth: Posterior pharynx without erythema. No exudate, ulcerations, or lesion. No tonsillar swelling.   Neck: Supple, full ROM, no rigidity, no cervical adenopathy.   Cardio: Normal rate and rhythm    Resp: Respirations even and unlabored, clear to auscultation bilaterally.   Abd: No ecchymosis or distension. Normal bowel sounds in all 4 quadrants. No tenderness to palpation. No rebound tenderness or guarding. No CVA tenderness.   Skin: No rashes or open lesions noted.   MSK: No swelling. No abrasions or signs of trauma. Ambulating without assistance.   Neuro: Alert,oriented No focal deficits noted. Facial expressions even.   Psych: Cooperative, Normal affect      Procedures    Procedures     Labs     Results for orders placed or performed in visit on 11/08/24   Comprehensive Metabolic Panel    Collection Time: 11/08/24  9:46 AM   Result Value Ref Range    Sodium 141 136 - 145 mmol/L    Potassium 4.9 3.5 - 5.1 mmol/L    Chloride 106 98 - 107 mmol/L    CO2 28 22 - 29 mmol/L    Glucose 101 (H) 74 - 100 mg/dL    Blood Urea Nitrogen 15.5 8.9 - 20.6 mg/dL    Creatinine 1.07 0.72 - 1.25 mg/dL    Calcium 9.8 8.4 - 10.2 mg/dL    Protein Total 7.6 6.4 - 8.3 gm/dL    Albumin 4.4 3.5 - 5.0 g/dL    Globulin 3.2 2.4 - 3.5 gm/dL    Albumin/Globulin Ratio 1.4 1.1 - 2.0 ratio    Bilirubin Total 0.5 <=1.5 mg/dL    ALP 75 40 - 150 unit/L    ALT 28 0 - 55 unit/L    AST 19 5 - 34 unit/L    eGFR >60 mL/min/1.73/m2    Anion Gap 7.0 mEq/L    BUN/Creatinine Ratio 14    Lipid Panel    Collection Time: 11/08/24  9:46 AM   Result Value Ref Range    Cholesterol Total 201 (H) <=200 mg/dL    HDL Cholesterol 29 (L) 35 - 60 mg/dL    Triglyceride 229 (H) 34 - 140 mg/dL    Cholesterol/HDL Ratio 7 (H) 0 - 5    Very Low Density Lipoprotein 46     LDL Cholesterol 126.00 50.00 - 140.00 mg/dL   TSH    Collection Time: 11/08/24  9:46 AM   Result Value Ref Range    TSH 2.433 0.350 - 4.940 uIU/mL   Hemoglobin A1C     Collection Time: 11/08/24  9:46 AM   Result Value Ref Range    Hemoglobin A1c 5.3 <=7.0 %    Estimated Average Glucose 105.4 mg/dL   Vitamin D    Collection Time: 11/08/24  9:46 AM   Result Value Ref Range    Vitamin D 15 (L) 30 - 80 ng/mL   CBC with Differential    Collection Time: 11/08/24  9:46 AM   Result Value Ref Range    WBC 9.36 4.50 - 11.50 x10(3)/mcL    RBC 5.37 4.70 - 6.10 x10(6)/mcL    Hgb 15.9 14.0 - 18.0 g/dL    Hct 45.8 42.0 - 52.0 %    MCV 85.3 80.0 - 94.0 fL    MCH 29.6 27.0 - 31.0 pg    MCHC 34.7 33.0 - 36.0 g/dL    RDW 12.5 11.5 - 17.0 %    Platelet 284 130 - 400 x10(3)/mcL    MPV 10.4 7.4 - 10.4 fL    Neut % 53.6 %    Lymph % 36.8 %    Mono % 7.9 %    Eos % 1.0 %    Basophil % 0.4 %    Lymph # 3.44 0.6 - 4.6 x10(3)/mcL    Neut # 5.02 2.1 - 9.2 x10(3)/mcL    Mono # 0.74 0.1 - 1.3 x10(3)/mcL    Eos # 0.09 0 - 0.9 x10(3)/mcL    Baso # 0.04 <=0.2 x10(3)/mcL    IG# 0.03 0 - 0.04 x10(3)/mcL    IG% 0.3 %       Chemistry:  Lab Results   Component Value Date     11/08/2024    K 4.9 11/08/2024    BUN 15.5 11/08/2024    CREATININE 1.07 11/08/2024    EGFRNORACEVR >60 11/08/2024    GLUCOSE 101 (H) 11/08/2024    CALCIUM 9.8 11/08/2024    ALKPHOS 75 11/08/2024    LABPROT 7.6 11/08/2024    ALBUMIN 4.4 11/08/2024    AST 19 11/08/2024    ALT 28 11/08/2024    OUIUBIHO49IX 15 (L) 11/08/2024    TSH 2.433 11/08/2024    [id number] 0.87 11/07/2023        Lab Results   Component Value Date    HGBA1C 5.3 11/08/2024        Hematology:  Lab Results   Component Value Date    WBC 9.36 11/08/2024    HGB 15.9 11/08/2024    HCT 45.8 11/08/2024     11/08/2024       Lipid Panel:  Lab Results   Component Value Date    CHOL 201 (H) 11/08/2024    HDL 29 (L) 11/08/2024    .00 11/08/2024    TRIG 229 (H) 11/08/2024    TOTALCHOLEST 7 (H) 11/08/2024        Urine:  Lab Results   Component Value Date    APPEARANCEUA Clear 11/07/2023    SGUA >=1.030 11/07/2023    PROTEINUA Trace (A) 11/07/2023    KETONESUA Negative  11/07/2023    LEUKOCYTESUR Negative 11/07/2023    RBCUA 0-5 11/07/2023    WBCUA None Seen 11/07/2023    BACTERIA None Seen 11/07/2023         Assessment            ICD-10-CM ICD-9-CM   1. Wellness examination  Z00.00 V70.0   2. Mild HTN  I10 401.9   3. Elevated cholesterol  E78.00 272.0   4. Vitamin D deficiency  E55.9 268.9   5. Elevated blood pressure reading  R03.0 796.2       Plan       1. Wellness examination  Discussed labs and preventative screenings   Overall health status reviewed.    Significant chronic conditions addressed, including ongoing treatment plans.   Good health habits reinforced.    Cardiovascular disease risk factors discussed.   Appropriate recommendations and preventative care medical   information provided with annual wellness exams encouraged.  Vaccination status       2. Mild HTN  Controlled with medication.   Continue current medication as prescribed   Low salt/ DASH diet   Discussed lifestyle modifications.   encourage aerobic excise at least 30 mins a day   Monitor BP daily goal less than 140/90.   Denies headaches, blurred vision, or dizziness    3. Elevated cholesterol  - Comprehensive Metabolic Panel; Future  - Lipid Panel; Future  - simvastatin (ZOCOR) 20 MG tablet; Take 1 tablet (20 mg total) by mouth every evening.  Dispense: 90 tablet; Refill: 1  Continue statin daily as prescribed.   Low fat, low cholesterol diet .  Increase dietary fiber  Avoid fried, fatty , high saturated fats.  Add flaxseed or fish oil omega supplement to diet .   exercise to reduce LDL and raise HDL. Exercise at least 30 mins a day as tolerated     4. Vitamin D deficiency  - Vitamin D; Future  - ergocalciferol (ERGOCALCIFEROL) 50,000 unit Cap; Take 1 capsule (50,000 Units total) by mouth every 7 days.  Dispense: 12 capsule; Refill: 0    5. Elevated blood pressure reading  - lisinopriL 10 MG tablet; Take 1 tablet (10 mg total) by mouth once daily.  Dispense: 30 tablet; Refill: 4    Orders Placed This  Encounter    Comprehensive Metabolic Panel    Lipid Panel    Vitamin D    ergocalciferol (ERGOCALCIFEROL) 50,000 unit Cap    lisinopriL 10 MG tablet    simvastatin (ZOCOR) 20 MG tablet      Medication List with Changes/Refills   New Medications    ERGOCALCIFEROL (ERGOCALCIFEROL) 50,000 UNIT CAP    Take 1 capsule (50,000 Units total) by mouth every 7 days.   Changed and/or Refilled Medications    Modified Medication Previous Medication    LISINOPRIL 10 MG TABLET lisinopriL 10 MG tablet       Take 1 tablet (10 mg total) by mouth once daily.    Take 1 tablet (10 mg total) by mouth once daily.    SIMVASTATIN (ZOCOR) 20 MG TABLET simvastatin (ZOCOR) 20 MG tablet       Take 1 tablet (20 mg total) by mouth every evening.    Take 1 tablet (20 mg total) by mouth every evening.       Follow up in about 4 months (around 3/11/2025), or if symptoms worsen or fail to improve, for lipid/ Vitamin d follow up .   Follow up in about 4 months (around 3/11/2025), or if symptoms worsen or fail to improve, for lipid/ Vitamin d follow up . In addition to their scheduled follow up, the patient has also been instructed to follow up on as needed basis.   Future Appointments   Date Time Provider Department Center   3/11/2025  1:45 PM Sergio Brice FNP [facility]   11/13/2025  1:00 PM Sergio Brice FNP [facility]

## 2025-02-09 DIAGNOSIS — E55.9 VITAMIN D DEFICIENCY: ICD-10-CM

## 2025-02-10 RX ORDER — ERGOCALCIFEROL 1.25 MG/1
50000 CAPSULE ORAL
Qty: 12 CAPSULE | Refills: 0 | Status: SHIPPED | OUTPATIENT
Start: 2025-02-10

## 2025-03-03 DIAGNOSIS — E55.9 VITAMIN D DEFICIENCY: Primary | ICD-10-CM

## 2025-05-19 ENCOUNTER — TELEPHONE (OUTPATIENT)
Dept: FAMILY MEDICINE | Facility: CLINIC | Age: 30
End: 2025-05-19
Payer: MEDICAID